# Patient Record
Sex: MALE
[De-identification: names, ages, dates, MRNs, and addresses within clinical notes are randomized per-mention and may not be internally consistent; named-entity substitution may affect disease eponyms.]

---

## 2024-10-01 ENCOUNTER — APPOINTMENT (OUTPATIENT)
Dept: ORTHOPEDIC SURGERY | Facility: CLINIC | Age: 69
End: 2024-10-01
Payer: MEDICARE

## 2024-10-01 DIAGNOSIS — F02.80 PARKINSON'S DISEASE: ICD-10-CM

## 2024-10-01 DIAGNOSIS — G56.32 LESION OF RADIAL NERVE, LEFT UPPER LIMB: ICD-10-CM

## 2024-10-01 DIAGNOSIS — G20 PARKINSON'S DISEASE: ICD-10-CM

## 2024-10-01 DIAGNOSIS — M21.332 WRIST DROP, LEFT WRIST: ICD-10-CM

## 2024-10-01 PROBLEM — Z00.00 ENCOUNTER FOR PREVENTIVE HEALTH EXAMINATION: Status: ACTIVE | Noted: 2024-10-01

## 2024-10-01 PROCEDURE — 73030 X-RAY EXAM OF SHOULDER: CPT | Mod: LT

## 2024-10-01 PROCEDURE — 73090 X-RAY EXAM OF FOREARM: CPT | Mod: LT

## 2024-10-01 PROCEDURE — 99203 OFFICE O/P NEW LOW 30 MIN: CPT

## 2024-10-01 PROCEDURE — L3908: CPT | Mod: LT

## 2024-10-01 NOTE — IMAGING
[de-identified] : LEFT HAND skin intact. no swelling. no TTP. elbow extension 4+/5 - symmetric to contralateral side. wrist ROM: no active extension, strong flexion. full passive wrist extension, flexion. able to extend thumb IPJ but not retropulse thumb. good FPL. unable to extend finger MPJ, but able to extend IPJ. able to make a loose fist. full passive finger extension, flexion. SILT to median, ulnar, radial distribution. palpable radial pulse, brisk cap refill all digits. no triggering.   XRAYS OF LEFT SHOULDER (3 views - PA, LATERAL, AND AXILLARY VIEWS): no acute displaced fracture or dislocation. XRAYS OF LEFT FOREARM (2 views - PA AND LATERAL VIEWS): no acute displaced fracture or dislocation.

## 2024-10-01 NOTE — HISTORY OF PRESENT ILLNESS
[de-identified] : 10/1/24: 68yo male (RHD) presents for LEFT wrist weakness. Reports h/o Parkinson's disease with frequent falls. He fell onto his LEFT side on 9/25/24, with bruising of his LEFT upper arm and LEFT thigh, which has since resolved. He took a nap on 9/28/24, and woke up with his wrist and fingers drooping. Reports finger motion has improved, but wrist is still weak.  Hx: Parkinson's disease. HTN. [FreeTextEntry5] : PAULA HARP a 69 year old male is here today c/o LEFT wrist weakness since 09/28/24. pt states he woke up with numbness and dropping. denies pain. reports a fall a few days before, denies injury to hand.

## 2024-10-01 NOTE — ASSESSMENT
[FreeTextEntry1] : The condition was explained to the patient. 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 Discussed importance of passive ROM to maintain supple joints. - Recommend wrist brace, full time except hygiene and ROM exercises. Fit for and provided brace in office today.  - avoid tight/constrictive clothing or accessories around wrist/forearm. - prescribed OT for L hand/wrist.  F/u 3 months. Consider EDX if no improvement.